# Patient Record
Sex: MALE | Race: OTHER | Employment: STUDENT | URBAN - METROPOLITAN AREA
[De-identification: names, ages, dates, MRNs, and addresses within clinical notes are randomized per-mention and may not be internally consistent; named-entity substitution may affect disease eponyms.]

---
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
## 2025-07-18 ENCOUNTER — HOSPITAL ENCOUNTER (OUTPATIENT)
Age: 20
Discharge: HOME | End: 2025-07-18
Payer: MEDICAID

## 2025-07-18 ENCOUNTER — APPOINTMENT (OUTPATIENT)
Dept: CT IMAGING | Facility: HOSPITAL | Age: 20
End: 2025-07-18
Attending: NURSE PRACTITIONER
Payer: MEDICAID

## 2025-07-18 VITALS
DIASTOLIC BLOOD PRESSURE: 69 MMHG | RESPIRATION RATE: 18 BRPM | HEART RATE: 91 BPM | SYSTOLIC BLOOD PRESSURE: 131 MMHG | OXYGEN SATURATION: 98 % | TEMPERATURE: 98 F

## 2025-07-18 DIAGNOSIS — J01.10 ACUTE NON-RECURRENT FRONTAL SINUSITIS: Primary | ICD-10-CM

## 2025-07-18 DIAGNOSIS — R51.9 ACUTE NONINTRACTABLE HEADACHE, UNSPECIFIED HEADACHE TYPE: ICD-10-CM

## 2025-07-18 PROCEDURE — 99213 OFFICE O/P EST LOW 20 MIN: CPT | Performed by: NURSE PRACTITIONER

## 2025-07-18 PROCEDURE — 70450 CT HEAD/BRAIN W/O DYE: CPT | Performed by: NURSE PRACTITIONER

## 2025-07-18 RX ORDER — NAPROXEN 500 MG/1
500 TABLET ORAL 2 TIMES DAILY PRN
Qty: 20 TABLET | Refills: 0 | Status: SHIPPED | OUTPATIENT
Start: 2025-07-18 | End: 2025-07-28

## 2025-07-18 NOTE — ED INITIAL ASSESSMENT (HPI)
Pt complaining of pain above the right eye since last Wednesday with droopiness of the right eye. Pt states he still has pain and the swelling.  No trauma.

## 2025-07-18 NOTE — ED PROVIDER NOTES
Patient Seen in: [facility]        History  Chief Complaint   Patient presents with    Swelling     Stated Complaint: Tenderness on the right eye lid; Spreading pain; Headache  and swelling on fore*    Subjective: This is a 19-year-old male, no significant past medical history, presents to immediate care clinic for evaluation of: Headache for the past week.  Patient states he thought his forehead was swollen and his eye was drooping last week when headache started.  He does state that this was the worst headache of his life.  He reports headache typically starts around 10 AM to 12 PM while he is at work.  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  He is currently without headache.  He states he had headache every day for about 5 days though.  No associated photophobia or phonophobia.  No vision changes or vision loss.  No numbness tingling weakness in his arms or legs.  Patient has no history of migraines or headaches.  However, patient is concerned because he believes his forehead is swollen above his right eye.  Denies fall, injury, trauma.  Well-appearing otherwise.  AO times 4  The history is provided by the patient.                     Objective:     History reviewed. No pertinent past medical history.           History reviewed. No pertinent surgical history.             Social History     Socioeconomic History    Marital status: Single   Tobacco Use    Smoking status: Never    Smokeless tobacco: Never              Review of Systems   Constitutional: Negative.    HENT: Negative.  Negative for sinus pressure, sinus pain and sneezing.    Eyes: Negative.  Negative for photophobia, pain, discharge and visual disturbance.   Respiratory: Negative.     Cardiovascular: Negative.    Gastrointestinal: Negative.    Endocrine: Negative.    Genitourinary: Negative.    Musculoskeletal: Negative.    Skin: Negative.    Neurological:  Positive for headaches. Negative for dizziness, seizures,  weakness and light-headedness.       Positive for stated complaint: Tenderness on the right eye lid; Spreading pain; Headache  and swelling on fore*  Other systems are as noted in HPI.  Constitutional and vital signs reviewed.      All other systems reviewed and negative except as noted above.                  Physical Exam    ED Triage Vitals [07/18/25 1626]   /69   Pulse 91   Resp 18   Temp 98.2 °F (36.8 °C)   Temp src Oral   SpO2 98 %   O2 Device None (Room air)       Current Vitals:   Vital Signs  BP: 131/69  Pulse: 91  Resp: 18  Temp: 98.2 °F (36.8 °C)  Temp src: Oral    Oxygen Therapy  SpO2: 98 %  O2 Device: None (Room air)            Physical Exam  Constitutional:       General: He is not in acute distress.     Appearance: Normal appearance. He is not ill-appearing or toxic-appearing.   HENT:      Head: Normocephalic.      Jaw: There is normal jaw occlusion. No trismus, tenderness, swelling, pain on movement or malocclusion.      Right Ear: Tympanic membrane, ear canal and external ear normal.      Left Ear: Tympanic membrane, ear canal and external ear normal.      Nose:      Right Sinus: Frontal sinus tenderness present. No maxillary sinus tenderness.      Left Sinus: No maxillary sinus tenderness or frontal sinus tenderness.      Mouth/Throat:      Mouth: Mucous membranes are moist.      Pharynx: No oropharyngeal exudate or posterior oropharyngeal erythema.   Cardiovascular:      Rate and Rhythm: Normal rate and regular rhythm.      Pulses: Normal pulses.      Heart sounds: Normal heart sounds.   Pulmonary:      Effort: Pulmonary effort is normal.      Breath sounds: Normal breath sounds.   Musculoskeletal:         General: Normal range of motion.      Cervical back: Normal range of motion.   Skin:     General: Skin is warm.      Capillary Refill: Capillary refill takes less than 2 seconds.   Neurological:      General: No focal deficit present.      Mental Status: He is alert and oriented to person,  place, and time.      GCS: GCS eye subscore is 4. GCS verbal subscore is 5. GCS motor subscore is 6.      Cranial Nerves: Cranial nerves 2-12 are intact.      Sensory: Sensation is intact.      Motor: Motor function is intact.                 ED Course  Labs Reviewed - No data to display       CT BRAIN OR HEAD (CPT=70450)  Result Date: 7/18/2025  CONCLUSION: 1.  2.  No acute intracranial abnormality 3.  Findings are suggestive of acute on chronic sinusitis involving the right frontal sinus, ethmoid air cells and right maxillary sinus. Electronically Verified and Signed by Attending Radiologist: Oc Guerrero MD 7/18/2025 6:02 PM Workstation: [email or web address]     Differentials considered include: Complex migraine, frontal sinusitis, seroma, cyst, abscess, other.    Patient Is currently without headache.  There is no proptosis on examination.  Positive tenderness to right forehead with some scant swelling.  No discernible fluid collection or fluctuance.  No palpable abscess or cyst.    Neurologically intact.  No photophobia.  EOMs intact.  No limitation or pain with EOMs.  Equal strength of bilateral upper and lower extremities with resistance.  No finger-nose ataxia.  Able to perform rapid alternating movements.    Would recommend CT scan of head to further evaluate patient's headache.    CT scan obtained, negative for acute intracranial normality.  Positive for acute on chronic sinusitis.    I did prescribe antibiotics for patient as well as naproxen.    Patient is aware to not take any of the NSAIDs while taking naproxen.  Also encouraged ice application and Tylenol.      Patient is aware to follow-up with ENT if no improvement of symptoms in the next 7 to 10 days.        Medical Decision Making  Amount and/or Complexity of Data Reviewed  Radiology: ordered.        Disposition and Plan     Clinical Impression:  1. Acute non-recurrent frontal sinusitis    2. Acute nonintractable headache,  unspecified headache type         Disposition:  Discharge  7/18/2025  6:19 pm    Follow-up:  Deacon Perea MD  [address] [place] [address]  [phone]    Schedule an appointment as soon as possible for a visit             Medications Prescribed:  Discharge Medication List as of 7/18/2025  6:21 PM        START taking these medications    Details   amoxicillin clavulanate 875-125 MG Oral Tab Take 1 tablet by mouth 2 (two) times daily for 10 days., Normal, Disp-20 tablet, R-0      naproxen 500 MG Oral Tab Take 1 tablet (500 mg total) by mouth 2 (two) times daily as needed., Normal, Disp-20 tablet, R-0                   Supplementary Documentation:

## 2025-07-18 NOTE — DISCHARGE INSTRUCTIONS
As discussed, CT scanning of your head was negative for any intracranial abnormality: Brain mass, brain bleeding, brain swelling, brain tumor.  It does show that you have frontal sinusitis.  For this I prescribed antibiotics.  Please start today.  Take twice a day for 10 days.  Take with food and water.    You may also take naproxen as needed for your headaches.  Take twice a day as needed.  Take with food and water.  Please do not take any Motrin, Advil, Aleve, ibuprofen, aspirin.    I would avoid sudden head movements or bending your head forward (leaning down to pick something up excetra) as this can put more pressure on your forehead and cause a headache.  You may also apply ice 4 times a day for 15 minutes.      If no improvement of symptoms in the next 7 to 10 days, please follow-up with ENT

## (undated) NOTE — LETTER
Date & Time: 12/12/2022, 1:06 PM  Patient: Lowell Streeter  Encounter Provider(s):    TERENCE Velazquez       To Whom It May Concern:    Lowell Streeter was seen and treated in our department on 12/12/2022. He can return to school 12/18/2022.   If you have any questions or concerns, please do not hesitate to call.        _____________________________  Physician/APC Signature

## (undated) NOTE — ED AVS SNAPSHOT
Parent/Legal Guardian Access to the Online [employer] Record of a Patient 15to 16Years Old  Return completed form by Secure email to [facility] HIM/Medical Records Department: [email or web address].     Requirements and Procedures   Under [facility] MyChart ID and password with another person, that person may be able to view my or my child’s health information, and health information about someone who has authorized me as a MyChart proxy.    ·  I agree that it is my responsibility to select a confident Sign-Up Form and I agree to its terms.        Authorization Form     Please enter Patient’s information below:   Name (last, first, middle initial) __________________________________________   Gender  Male  Female    Last 4 Digits of Social Security Number Parent/Legal Guardian Signature                                  For Patient (1517 years of age)  I agree to allow my parent/legal guardian, named above, online access to my medical information currently available and that may become available as a result

## (undated) NOTE — ED AVS SNAPSHOT
Parent/Legal Guardian Access to the Online [employer] Record of a Patient 15to 16Years Old  Return completed form by Secure email to [facility] HIM/Medical Records Department: [email or web address].     Requirements and Procedures   Under [facility] MyChart ID and password with another person, that person may be able to view my or my child’s health information, and health information about someone who has authorized me as a MyChart proxy.    ·  I agree that it is my responsibility to select a confident Sign-Up Form and I agree to its terms.        Authorization Form     Please enter Patient’s information below:   Name (last, first, middle initial) __________________________________________   Gender  Male  Female    Last 4 Digits of Social Security Number Parent/Legal Guardian Signature                                  For Patient (1417 years of age)  I agree to allow my parent/legal guardian, named above, online access to my medical information currently available and that may become available as a result

## (undated) NOTE — LETTER
Date & Time: 8/16/2021, 4:21 PM  Patient: Caryle Avena  Encounter Provider(s):    TERENCE Amor       To Whom It May Concern:    Caryle Avena was seen and treated in our department on 8/16/2021.  He should not return to school until 08/18/2021 du